# Patient Record
Sex: FEMALE | Race: WHITE | Employment: FULL TIME | URBAN - METROPOLITAN AREA
[De-identification: names, ages, dates, MRNs, and addresses within clinical notes are randomized per-mention and may not be internally consistent; named-entity substitution may affect disease eponyms.]

---

## 2017-08-29 ENCOUNTER — HOSPITAL ENCOUNTER (EMERGENCY)
Age: 27
Discharge: HOME | End: 2017-08-29
Attending: EMERGENCY MEDICINE
Payer: MEDICAID

## 2017-08-29 VITALS
DIASTOLIC BLOOD PRESSURE: 72 MMHG | WEIGHT: 105 LBS | RESPIRATION RATE: 16 BRPM | HEART RATE: 57 BPM | HEIGHT: 61 IN | TEMPERATURE: 98.7 F | BODY MASS INDEX: 19.83 KG/M2 | SYSTOLIC BLOOD PRESSURE: 114 MMHG | OXYGEN SATURATION: 99 %

## 2017-08-29 DIAGNOSIS — M25.531 RIGHT WRIST PAIN: ICD-10-CM

## 2017-08-29 DIAGNOSIS — Z13.9 ENCOUNTER FOR MEDICAL SCREENING EXAMINATION: Primary | ICD-10-CM

## 2017-08-29 LAB
-: ABNORMAL
AMORPHOUS: ABNORMAL
BACTERIA: ABNORMAL
BILIRUBIN URINE: NEGATIVE
CASTS UA: ABNORMAL /LPF
COLOR: YELLOW
COMMENT UA: ABNORMAL
CRYSTALS, UA: ABNORMAL /HPF
EPITHELIAL CELLS UA: ABNORMAL /HPF
GLUCOSE URINE: NEGATIVE
HCG(URINE) PREGNANCY TEST: NEGATIVE
KETONES, URINE: NEGATIVE
LEUKOCYTE ESTERASE, URINE: ABNORMAL
MUCUS: ABNORMAL
NITRITE, URINE: NEGATIVE
OTHER OBSERVATIONS UA: ABNORMAL
PH UA: 7 (ref 5–8)
PROTEIN UA: NEGATIVE
RBC UA: ABNORMAL /HPF
RENAL EPITHELIAL, UA: ABNORMAL /HPF
SPECIFIC GRAVITY UA: 1.02 (ref 1–1.03)
TRICHOMONAS: ABNORMAL
TURBIDITY: CLEAR
URINE HGB: ABNORMAL
UROBILINOGEN, URINE: NORMAL
WBC UA: ABNORMAL /HPF
YEAST: ABNORMAL

## 2017-08-29 PROCEDURE — 99285 EMERGENCY DEPT VISIT HI MDM: CPT

## 2017-08-29 PROCEDURE — 6370000000 HC RX 637 (ALT 250 FOR IP): Performed by: EMERGENCY MEDICINE

## 2017-08-29 PROCEDURE — 81001 URINALYSIS AUTO W/SCOPE: CPT

## 2017-08-29 PROCEDURE — 84703 CHORIONIC GONADOTROPIN ASSAY: CPT

## 2017-08-29 PROCEDURE — 93005 ELECTROCARDIOGRAM TRACING: CPT

## 2017-08-29 RX ORDER — IBUPROFEN 600 MG/1
600 TABLET ORAL EVERY 6 HOURS PRN
Qty: 30 TABLET | Refills: 0 | Status: SHIPPED | OUTPATIENT
Start: 2017-08-29 | End: 2017-10-04

## 2017-08-29 RX ORDER — IBUPROFEN 600 MG/1
600 TABLET ORAL ONCE
Status: COMPLETED | OUTPATIENT
Start: 2017-08-29 | End: 2017-08-29

## 2017-08-29 RX ADMIN — IBUPROFEN 600 MG: 600 TABLET, FILM COATED ORAL at 18:56

## 2017-08-29 ASSESSMENT — PAIN DESCRIPTION - PAIN TYPE: TYPE: ACUTE PAIN

## 2017-08-29 ASSESSMENT — PAIN DESCRIPTION - DESCRIPTORS: DESCRIPTORS: ACHING

## 2017-08-29 ASSESSMENT — PAIN SCALES - GENERAL
PAINLEVEL_OUTOF10: 5
PAINLEVEL_OUTOF10: 5

## 2017-08-29 ASSESSMENT — PAIN DESCRIPTION - FREQUENCY: FREQUENCY: CONTINUOUS

## 2017-09-05 LAB
EKG ATRIAL RATE: 80 BPM
EKG P AXIS: 68 DEGREES
EKG P-R INTERVAL: 140 MS
EKG Q-T INTERVAL: 366 MS
EKG QRS DURATION: 76 MS
EKG QTC CALCULATION (BAZETT): 422 MS
EKG R AXIS: 69 DEGREES
EKG T AXIS: 29 DEGREES
EKG VENTRICULAR RATE: 80 BPM

## 2017-10-04 ENCOUNTER — HOSPITAL ENCOUNTER (EMERGENCY)
Age: 27
Discharge: HOME | End: 2017-10-04
Attending: EMERGENCY MEDICINE

## 2017-10-04 ENCOUNTER — APPOINTMENT (OUTPATIENT)
Dept: GENERAL RADIOLOGY | Age: 27
End: 2017-10-04

## 2017-10-04 VITALS
HEIGHT: 62 IN | DIASTOLIC BLOOD PRESSURE: 66 MMHG | SYSTOLIC BLOOD PRESSURE: 120 MMHG | HEART RATE: 88 BPM | TEMPERATURE: 98.6 F | BODY MASS INDEX: 20.24 KG/M2 | WEIGHT: 110 LBS | OXYGEN SATURATION: 100 % | RESPIRATION RATE: 16 BRPM

## 2017-10-04 DIAGNOSIS — M77.8 TENDINITIS OF WRIST: Primary | ICD-10-CM

## 2017-10-04 PROCEDURE — 73110 X-RAY EXAM OF WRIST: CPT

## 2017-10-04 PROCEDURE — 6370000000 HC RX 637 (ALT 250 FOR IP): Performed by: PHYSICIAN ASSISTANT

## 2017-10-04 PROCEDURE — 99283 EMERGENCY DEPT VISIT LOW MDM: CPT

## 2017-10-04 RX ORDER — PREDNISONE 20 MG/1
20 TABLET ORAL DAILY
Qty: 5 TABLET | Refills: 0 | Status: SHIPPED | OUTPATIENT
Start: 2017-10-04 | End: 2017-10-09

## 2017-10-04 RX ORDER — IBUPROFEN 600 MG/1
600 TABLET ORAL ONCE
Status: COMPLETED | OUTPATIENT
Start: 2017-10-04 | End: 2017-10-04

## 2017-10-04 RX ORDER — IBUPROFEN 600 MG/1
600 TABLET ORAL ONCE
Status: DISCONTINUED | OUTPATIENT
Start: 2017-10-04 | End: 2017-10-04

## 2017-10-04 RX ADMIN — IBUPROFEN 600 MG: 600 TABLET, FILM COATED ORAL at 20:59

## 2017-10-04 ASSESSMENT — PAIN DESCRIPTION - PAIN TYPE: TYPE: ACUTE PAIN

## 2017-10-04 ASSESSMENT — PAIN SCALES - GENERAL
PAINLEVEL_OUTOF10: 4
PAINLEVEL_OUTOF10: 8

## 2017-10-04 ASSESSMENT — PAIN DESCRIPTION - LOCATION: LOCATION: WRIST

## 2017-10-04 ASSESSMENT — PAIN DESCRIPTION - ORIENTATION: ORIENTATION: RIGHT

## 2017-10-04 ASSESSMENT — PAIN DESCRIPTION - DESCRIPTORS: DESCRIPTORS: SHARP;SHOOTING

## 2017-10-04 ASSESSMENT — ENCOUNTER SYMPTOMS: BACK PAIN: 0

## 2017-10-04 ASSESSMENT — PAIN DESCRIPTION - ONSET: ONSET: PROGRESSIVE

## 2017-10-04 NOTE — ED AVS SNAPSHOT
ED Patient Work/School Excuse Letter         [address]  [address] [facility] [address]  [phone]  Dept: [phone]       October 4, 2017    Patient: Livier Urbina   YOB: 1990   Date of Visit: 10/4/2017       To Whom It May Concern:    Sydney Drake was seen and treated in our Emergency Department on 10/4/2017. She may return to work on 10/06/17           If you have any questions or concerns, please don't hesitate to call.     Sincerely,              Signature:__________________________________

## 2017-10-04 NOTE — ED AVS SNAPSHOT
After Visit Summary  (Discharge Instructions)    Medication List for Home    Based on the information you provided to us as well as any changes during this visit, the following is your updated medication list.  Compare this with your prescription bottles at home. If you have any questions or concerns, contact your primary care physician's office. Daily Medication List (This medication list can be shared with any Healthcare provider who is helping you manage your medications)      There are NEW medications for you. START taking them after you leave the hospital     predniSONE 20 MG tablet   Commonly known as:  DELTASONE   Take 1 tablet by mouth daily for 5 days         ASK your doctor about these medications if you have questions     ibuprofen 600 MG tablet   Commonly known as:  ADVIL;MOTRIN   Take 1 tablet by mouth every 6 hours as needed for Pain       naproxen 375 MG tablet   Commonly known as:  NAPROSYN   Take 1 tablet by mouth 2 times daily       norelgestromin-ethinyl estradiol 150-35 MCG/24HR   Commonly known as:  XULANE   APPLY ONE PATCH EXTERNALLY EVERY 7 DAYS. REMOVE OLD PATCH. Where to Get Your Medications      You can get these medications from any pharmacy     Bring a paper prescription for each of these medications     predniSONE 20 MG tablet               Allergies as of 10/4/2017     No Known Allergies      Immunizations as of 10/4/2017     No immunizations on file. After Visit Summary    This summary was created for you. Thank you for entrusting your care to us.   The following information includes details about your hospital/visit stay along with steps you should take to help with your recovery once you leave the hospital.  In this packet, you will find information about the topics listed below:    · Instructions about your medications including a list of your home medications  · A summary of your hospital visit · Follow-up appointments once you have left the hospital  · Your care plan at home      You may receive a survey regarding the care you received during your stay. Your input is valuable to us. We encourage you to complete and return your survey in the envelope provided. We hope you will choose us in the future for your healthcare needs. Patient Information     Patient Name ARMANDO Rapp 1990      Care Provided at:     Name Address Phone       [address]  [address] [phone]            Your Visit    Here you will find information about your visit, including the reason for your visit. Please take this sheet with you when you visit your doctor or other health care provider in the future. It will help determine the best possible medical care for you at that time. If you have any questions once you leave the hospital, please call the department phone number listed below. Diagnoses this visit     Your diagnosis was TENDINITIS OF WRIST. Visit Information     Date of Visit Department Dept Phone    10/4/2017 [facility] [phone]      You were seen by     You were seen by Lawrence Michel MD and Shantelle Ramsey PA-C. Follow-up Appointments    Below is a list of your follow-up and future appointments. This may not be a complete list as you may have made appointments directly with providers that we are not aware of or your providers may have made some for you. Please call your providers to confirm appointments. It is important to keep your appointments. Please bring your current insurance card, photo ID, co-pay, and all medication bottles to your appointment. If self-pay, payment is expected at the time of service.         Follow-up Information     Schedule an appointment as soon as possible for a visit with Karlie Gamboa MD.    Specialty:  Family Medicine    Why:  As needed    Contact information: Children exposed to secondhand smoke are at an increased risk of:  Sudden Infant Death Syndrome (SIDS), acute respiratory infections, inflammation of the middle ear, and severe asthma. Over a longer time, it causes heart disease and lung cancer. There is no safe level of exposure to secondhand smoke. Important information for a smoker       SMOKING: QUIT SMOKING. THIS IS THE MOST IMPORTANT ACTION YOU CAN TAKE TO IMPROVE YOUR CURRENT AND FUTURE HEALTH. Call the [facility] [place] NOW ([phone])    Smoking harms nonsmokers. When nonsmokers are around people who smoke, they absorb nicotine, carbon monoxide, and other ingredients of tobacco smoke. DO NOT SMOKE AROUND CHILDREN     Children exposed to secondhand smoke are at an increased risk of:  Sudden Infant Death Syndrome (SIDS), acute respiratory infections, inflammation of the middle ear, and severe asthma. Over a longer time, it causes heart disease and lung cancer. There is no safe level of exposure to secondhand smoke. [employer] Signup     [employer] allows you to send messages to your doctor, view your test results, renew your prescriptions, schedule appointments, view visit notes, and more. How Do I Sign Up? 1. In your Internet browser, go to [email or web address]  2. Click on the Sign Up Now link in the Sign In box. You will see the New Member Sign Up page. 3. Enter your [employer] Access Code exactly as it appears below. You will not need to use this code after youve completed the sign-up process. If you do not sign up before the expiration date, you must request a new code. [employer] Access Code: [email or web address]  Expires: 10/28/2017  6:39 PM    4. Enter your Social Security Number ([id number]) and Date of Birth (mm/dd/yyyy) as indicated and click Submit. You will be taken to the next sign-up page. 5. Create a My [employer] ID. This will be your InfoRemate login ID and cannot be changed, so think of one that is secure and easy to remember. 6. Create a [employer] password. You can change your password at any time. 7. Enter your Password Reset Question and Answer. This can be used at a later time if you forget your password. 8. Enter your e-mail address. You will receive e-mail notification when new information is available in [address] 9. Click Sign Up. You can now view your medical record. Additional Information  If you have questions, please contact the physician practice where you receive care. Remember, InfoRemate is NOT to be used for urgent needs. For medical emergencies, dial 911. For questions regarding your [employer] account call [phone]. If you have a clinical question, please call your doctor's office. View your information online  ? Review your current list of  medications, immunization, and allergies. ? Review your future test results online . ? Review your discharge instructions provided by your caregivers at discharge    Certain functionality such as prescription refills, scheduling appointments or sending messages to your provider are not activated if your provider does not use TheTake in his/her office    For questions regarding your [employer] account call [phone]. If you have a clinical question, please call your doctor's office. The information on all pages of the After Visit Summary has been reviewed with me, the patient and/or responsible adult, by my health care provider(s). I had the opportunity to ask questions regarding this information. I understand I should dispose of my armband safely at home to protect my health information. A complete copy of the After Visit Summary has been given to me, the patient and/or responsible adult.          Patient Signature/Responsible Adult: ___________________________________ snug. But it should not be tight enough to cause numbness, tingling, or swelling. · If your doctor gave you a splint or brace, wear it as directed. It will protect your wrist until it is better. · Take pain medicines exactly as directed. ¨ If the doctor gave you a prescription medicine for pain, take it as prescribed. ¨ If you are not taking a prescription pain medicine, ask your doctor if you can take an over-the-counter medicine. · If your doctor prescribes antibiotics, take them as directed. Do not stop taking them just because you feel better. You need to take the full course of antibiotics. · Try not to use your injured wrist and hand. · After you are better, do exercises to make the muscles around your tendon stronger. This can prevent the problem from coming back. Follow instructions from your doctor. When should you call for help? Call your doctor now or seek immediate medical care if:  · Your hand or fingers are cool or pale or change colors. · You have tingling, weakness, or numbness in your hand and fingers. · Your pain gets worse. · The tendon may be infected. Signs of infection include:  ¨ Increased pain and tenderness around the wrist or thumb. ¨ Swelling or redness of the wrist or thumb. ¨ A fever. Watch closely for changes in your health, and be sure to contact your doctor if:  · You do not get better as expected. Where can you learn more? Go to [email or web address]. [email or web address] and sign in to your [email or web address] account. Enter I793 in the [facility] box to learn more about \"Tenosynovitis of the Wrist: Care Instructions. \"     If you do not have an account, please click on the \"Sign Up Now\" link. Current as of: March 21, 2017  Content Version: 11.3  © 7214-7798 [employer]. Care instructions adapted under license by [facility] ([facility]).  If you have questions about a medical condition or this instruction, always ask your healthcare professional. [employer] disclaims any warranty or liability for your use of this information.

## 2017-10-05 NOTE — ED NOTES
Pt given instructions for follow-up and discharge. Pt given education on prednisone. Pt given education on splint care. Pt verbalizes understanding. Pt is A&O x4, PWD, eupneic, and ambulatory with steady, even gait upon discharge.       Nilesh Arredondo RN  10/04/17 1065

## 2017-10-05 NOTE — ED PROVIDER NOTES
[address]  eMERGENCY dEPARTMENT eNCOUnter      Pt Name: Roberto Browning  MRN: [id number]  [place] 1990  Date of evaluation: 10/4/17      CHIEF COMPLAINT       Chief Complaint   Patient presents with    Wrist Pain     right wrist pain s/p laoding & unloading boxes         HISTORY OF PRESENT ILLNESS    Roberto Browning is a 33 y.o. female who presents complaining of right wrist pain  Patient is a 33 y.o. female presenting with wrist fracture. Wrist Problem   Location:  Wrist  Affected location: Diffuse right wrist pain after lifting heavy boxes did not fall there is no trauma. Injury: no    Pain details:     Quality:  Aching    Radiates to:  R fingers    Severity:  Mild    Timing:  Constant    Progression:  Unchanged  Handedness:  Right-handed  Dislocation: no    Foreign body present:  No foreign bodies  Prior injury to area:  Unable to specify  Relieved by: Ice and rest  Worsened by: Movement and stretching area  Ineffective treatments:  None tried  Associated symptoms: numbness    Associated symptoms: no back pain, no fatigue, no swelling and no tingling        REVIEW OF SYSTEMS       Review of Systems   Constitutional: Negative for fatigue. Musculoskeletal: Negative for back pain. PAST MEDICAL HISTORY     Past Medical History:   Diagnosis Date    ADHD (attention deficit hyperactivity disorder)     Costochondritis        SURGICAL HISTORY       Past Surgical History:   Procedure Laterality Date    APPENDECTOMY         CURRENT MEDICATIONS       Previous Medications    NAPROXEN (NAPROSYN) 375 MG TABLET    Take 1 tablet by mouth 2 times daily    NORELGESTROMIN-ETHINYL ESTRADIOL (XULANE) 150-35 MCG/24HR    APPLY ONE PATCH EXTERNALLY EVERY 7 DAYS. REMOVE OLD PATCH. ALLERGIES     has No Known Allergies. FAMILY HISTORY     indicated that the status of her brother is unknown. She indicated that the status of her maternal grandmother is unknown.       SOCIAL HISTORY      reports that she has been smoking Cigarettes. She has a 2.50 pack-year smoking history. She has never used smokeless tobacco. She reports that she drinks alcohol. She reports that she does not use illicit drugs. PHYSICAL EXAM     INITIAL VITALS: /66  Pulse 88  Temp 98.6 °F (37 °C) (Oral)   Resp 16  Ht 5' 2\" (1.575 m)  Wt 110 lb (49.9 kg)  LMP 09/25/2017  SpO2 100%  BMI 20.12 kg/m2     Physical Exam   Constitutional: She is oriented to person, place, and time. She appears well-developed and well-nourished. HENT:   Head: Normocephalic and atraumatic. Cardiovascular: Normal rate, regular rhythm and normal heart sounds. Pulmonary/Chest: Effort normal and breath sounds normal.   Musculoskeletal: She exhibits tenderness. She exhibits no edema or deformity. Right wrist: She exhibits normal range of motion, no tenderness, no bony tenderness, no swelling, no effusion and no crepitus. Negative Tinel's negative Phalen's, pain worse along the medial aspect radiates to the right index finger, there is no swelling there is no redness, full range of motion sensation intact brisk capillary refill noted   Neurological: She is alert and oriented to person, place, and time. MEDICAL DECISION MAKING:         DIAGNOSTIC RESULTS     EKG: All EKG's are interpreted by the Emergency Department Physician who either signs or Co-signs this chart in the absence of a cardiologist.        RADIOLOGY:All plain film, CT, MRI, and formal ultrasound images (except ED bedside ultrasound) are read by the radiologist and the images and interpretations are directly viewed by the emergency physician. FINDINGS:   No bone, joint or soft tissue abnormality.  No evidence of an acute fracture   or dislocation.           Impression   Normal right wrist.           LABS: All lab results were reviewed by myself, and all abnormals are listed below.   Labs Reviewed - No data to display      EMERGENCY DEPARTMENT COURSE:   Vitals: Vitals:    10/04/17 2009   BP: 120/66   Pulse: 88   Resp: 16   Temp: 98.6 °F (37 °C)   TempSrc: Oral   SpO2: 100%   Weight: 110 lb (49.9 kg)   Height: 5' 2\" (1.575 m)       The patient was given the following medications while in the emergency department:  Orders Placed This Encounter   Medications    ibuprofen (ADVIL;MOTRIN) tablet 600 mg    predniSONE (DELTASONE) 20 MG tablet     Sig: Take 1 tablet by mouth daily for 5 days     Dispense:  5 tablet     Refill:  0    DISCONTD: ibuprofen (ADVIL;MOTRIN) tablet 600 mg       -------------------------      CRITICAL CARE:     CONSULTS:  None    PROCEDURES:  Procedures    FINAL IMPRESSION      1.  Tendinitis of wrist          DISPOSITION/PLAN   DISPOSITION     PATIENT REFERRED TO:  Jai Damon MD  [address] [facility] [address] [facility] 100 [facility] [address]  [phone]    Schedule an appointment as soon as possible for a visit  As needed    Tanner Jung MD  [address]  [address] [facility] [address]  [phone]    Schedule an appointment as soon as possible for a visit in 2 days  As needed    Osbaldo Willis MD  [address] [address] [phone]    Schedule an appointment as soon as possible for a visit in 2 days  If symptoms worsen      DISCHARGE MEDICATIONS:  New Prescriptions    PREDNISONE (DELTASONE) 20 MG TABLET    Take 1 tablet by mouth daily for 5 days       (Please note that portions of this note were completed with a voice recognition program.  Efforts were made to edit the dictations but occasionally words are mis-transcribed.)    CAESAR Rm PA-C  10/04/17 2905

## 2017-11-16 ENCOUNTER — HOSPITAL ENCOUNTER (EMERGENCY)
Age: 27
Discharge: HOME | End: 2017-11-16
Attending: EMERGENCY MEDICINE

## 2017-11-16 VITALS
OXYGEN SATURATION: 100 % | DIASTOLIC BLOOD PRESSURE: 80 MMHG | RESPIRATION RATE: 16 BRPM | HEIGHT: 62 IN | HEART RATE: 80 BPM | SYSTOLIC BLOOD PRESSURE: 131 MMHG | BODY MASS INDEX: 21.16 KG/M2 | TEMPERATURE: 98.5 F | WEIGHT: 115 LBS

## 2017-11-16 DIAGNOSIS — M79.641 RIGHT HAND PAIN: Primary | ICD-10-CM

## 2017-11-16 PROCEDURE — 99283 EMERGENCY DEPT VISIT LOW MDM: CPT

## 2017-11-16 ASSESSMENT — PAIN SCALES - GENERAL: PAINLEVEL_OUTOF10: 6

## 2017-11-16 ASSESSMENT — PAIN DESCRIPTION - DESCRIPTORS: DESCRIPTORS: ACHING

## 2017-11-16 ASSESSMENT — PAIN DESCRIPTION - ORIENTATION: ORIENTATION: RIGHT

## 2017-11-16 ASSESSMENT — PAIN DESCRIPTION - LOCATION: LOCATION: HAND

## 2017-11-16 ASSESSMENT — PAIN DESCRIPTION - FREQUENCY: FREQUENCY: CONTINUOUS

## 2017-11-16 NOTE — ED PROVIDER NOTES
has been smoking Cigarettes. She has a 2.50 pack-year smoking history. She has never used smokeless tobacco. She reports that she drinks alcohol. She reports that she does not use drugs. Lives at home with others    PHYSICAL EXAM    (up to 7 for level 4, 8 or more for level 5)     ED Triage Vitals [11/16/17 1708]   BP Temp Temp Source Pulse Resp SpO2 Height Weight   131/80 98.5 °F (36.9 °C) Oral 80 16 100 % 5' 2\" (1.575 m) 115 lb (52.2 kg)       Physical Exam   Nursing note and vitals reviewed. Constitutional: Oriented to person, place, and time and well-developed, well-nourished, and in no distress. Head: Normocephalic and atraumatic. Ear: External ears normal.   Nose: Nose normal and midline. Eyes: Conjunctivae and EOM are normal. Pupils are equal, round, and reactive to light. Cardiovascular: Normal rate, regular rhythm, normal heart sounds and intact distal pulses. Pulmonary/Chest: Effort normal and breath sounds normal. No respiratory distress. No wheezes. No rales. No chest tenderness. Musculoskeletal: no deformity or swelling to right hand.  strength 5/5 distal n/v intact. FROM. Neurological: Alert and oriented to person, place, and time. GCS score is 15. Skin: Skin is warm and dry. No rash noted. No erythema. No pallor. DIAGNOSTIC RESULTS     RADIOLOGY:   All plain film, CT, MRI, and formal ultrasound images (except ED bedside ultrasound) are read by the radiologist and the images and interpretations are directly viewed by the emergency physician. No orders to display           No results found. LABS:  Labs Reviewed - No data to display    All other labs were within normal range or not returned as of this dictation.     EMERGENCY DEPARTMENT COURSE and DIFFERENTIAL DIAGNOSIS/MDM:   Vitals:    Vitals:    11/16/17 1708   BP: 131/80   Pulse: 80   Resp: 16   Temp: 98.5 °F (36.9 °C)   TempSrc: Oral   SpO2: 100%   Weight: 115 lb (52.2 kg)   Height: 5' 2\" (1.575 m)       Ace

## 2018-08-20 ENCOUNTER — HOSPITAL ENCOUNTER (EMERGENCY)
Age: 28
Discharge: HOME | End: 2018-08-20
Attending: EMERGENCY MEDICINE

## 2018-08-20 VITALS
BODY MASS INDEX: 20.2 KG/M2 | HEIGHT: 61 IN | TEMPERATURE: 97.5 F | OXYGEN SATURATION: 98 % | SYSTOLIC BLOOD PRESSURE: 120 MMHG | RESPIRATION RATE: 16 BRPM | DIASTOLIC BLOOD PRESSURE: 75 MMHG | WEIGHT: 107 LBS | HEART RATE: 87 BPM

## 2018-08-20 DIAGNOSIS — B35.4 TINEA CORPORIS: Primary | ICD-10-CM

## 2018-08-20 PROCEDURE — 99282 EMERGENCY DEPT VISIT SF MDM: CPT

## 2018-08-20 RX ORDER — KETOCONAZOLE 20 MG/G
CREAM TOPICAL
Qty: 30 G | Refills: 0 | Status: SHIPPED | OUTPATIENT
Start: 2018-08-20

## 2018-08-20 ASSESSMENT — ENCOUNTER SYMPTOMS
TROUBLE SWALLOWING: 0
VOMITING: 0
NAUSEA: 0
COUGH: 0
SHORTNESS OF BREATH: 0

## 2018-08-21 NOTE — ED PROVIDER NOTES
[address]  eMERGENCY dEPARTMENT eNCOUnter   Independent Attestation     Pt Name: Marizol Mathew  MRN: [id number]  [place] 1990  Date of evaluation: 8/21/18     Marizol Mathew is a 33 y.o. female with CC: Tinea (chest)      Based on the medical record the care appears appropriate. I was personally available for consultation in the Emergency Department.     Gautam Miles MD  Attending Emergency Physician                    Abiola Alberto MD  08/21/18 7732

## 2018-08-21 NOTE — ED PROVIDER NOTES
[address] [facility] ED  eMERGENCY dEPARTMENT eNCOUnter      Pt Name: Ronen Avila  MRN: [id number]  [place] 1990  Date of evaluation: 8/20/2018  Provider: Marilyn Carey       Chief Complaint   Patient presents with    Tinea     chest         HISTORY OF PRESENT ILLNESS  (Location/Symptom, Timing/Onset, Context/Setting, Quality, Duration, Modifying Factors, Severity.)   Ronen Avila is a 33 y.o. female who presents to the emergency department With complaints of rash to upper chest.  States it started about a week ago and has been spreading. Patient states that initially she had 2 circular red lesions to right upper chest and now she has 1 more to left upper chest.  Has been applying Lotrimin cream but does not seem like it's working. Lesions  are very itchy but not painful. Relates that recently she stayed with her friend who also has a cat. Nursing Notes were reviewed and I agree. REVIEW OF SYSTEMS    (2-9 systems for level 4, 10 or more for level 5)     Review of Systems   Constitutional: Negative for chills and fever. HENT: Negative for trouble swallowing. Respiratory: Negative for cough and shortness of breath. Cardiovascular: Negative for chest pain and palpitations. Gastrointestinal: Negative for nausea and vomiting. Skin: Positive for rash. Except as noted above the remainder of the review of systems was reviewed and negative. PAST MEDICAL HISTORY         Diagnosis Date    ADHD (attention deficit hyperactivity disorder)     Costochondritis      Reviewed. SURGICAL HISTORY           Procedure Laterality Date    APPENDECTOMY       Reviewed. CURRENT MEDICATIONS       Previous Medications    NAPROXEN (NAPROSYN) 375 MG TABLET    Take 1 tablet by mouth 2 times daily    NORELGESTROMIN-ETHINYL ESTRADIOL (XULANE) 150-35 MCG/24HR    APPLY ONE PATCH EXTERNALLY EVERY 7 DAYS. REMOVE OLD PATCH.        ALLERGIES     Patient has no known allergies. FAMILY HISTORY       Family History   Problem Relation Age of Onset    Heart Disease Brother     Heart Disease Maternal Grandmother     Cancer Maternal Grandmother      Family Status   Relation Status    Brother (Not Specified)    MGM (Not Specified)      Reviewed and not relevant. SOCIAL HISTORY      reports that she has been smoking Cigarettes. She has a 2.50 pack-year smoking history. She has never used smokeless tobacco. She reports that she drinks alcohol. She reports that she does not use drugs. Reviewed. PHYSICAL EXAM    (up to 7 for level 4, 8 or more for level 5)     ED Triage Vitals [08/20/18 2231]   BP Temp Temp Source Pulse Resp SpO2 Height Weight   120/75 97.5 °F (36.4 °C) Oral 87 16 98 % 5' 1\" (1.549 m) 107 lb (48.5 kg)       Physical Exam   Constitutional: She is oriented to person, place, and time. She appears well-developed and well-nourished. No distress. HENT:   Head: Normocephalic and atraumatic. Right Ear: External ear normal.   Left Ear: External ear normal.   Nose: Nose normal.   Eyes: Right eye exhibits no discharge. Left eye exhibits no discharge. No scleral icterus. Neck: Normal range of motion. No tracheal deviation present. Pulmonary/Chest: Effort normal. No stridor. No respiratory distress. Musculoskeletal: Normal range of motion. She exhibits no edema. Neurological: She is alert and oriented to person, place, and time. Coordination normal.   Skin: Skin is warm and dry. Rash noted. She is not diaphoretic. Psychiatric: She has a normal mood and affect. Her behavior is normal.       DIAGNOSTIC RESULTS     RADIOLOGY:   none      LABS:  Labs Reviewed - No data to display    All other labs were within normal range or not returned as of this dictation.     EMERGENCY DEPARTMENT COURSE and DIFFERENTIAL DIAGNOSIS/MDM:   Patient presents to ED with complaints of Rash to right and left upper chest.  Has raised erythematous annular lesions consistent with ringworm. Has tried Lotrimin cream with no improvement. Will prescribe ketoconazole cream.  Patient advised to follow-up with dermatologist if symptoms do not resolve with treatment. Enid Ridley to discharge home. Follow-up with family doctor or clinic of choice in 1 or 2 days for a recheck. Return to ED if any worsening or new symptoms. Vitals:    Vitals:    08/20/18 2231   BP: 120/75   Pulse: 87   Resp: 16   Temp: 97.5 °F (36.4 °C)   TempSrc: Oral   SpO2: 98%   Weight: 107 lb (48.5 kg)   Height: 5' 1\" (1.549 m)       Vitals reviewed. PROCEDURES:  None    FINAL IMPRESSION      1. Tinea corporis          DISPOSITION/PLAN   DISPOSITION Decision To Discharge 08/20/2018 10:58:32 PM      PATIENT REFERRED TO:  Priscila Arguelles MD  [address] [place] [phone]    Schedule an appointment as soon as possible for a visit   Follow up visit    [facility] ED  [facility] [phone]    If symptoms worsen      DISCHARGE MEDICATIONS:  New Prescriptions    KETOCONAZOLE (NIZORAL) 2 % CREAM    Apply topically daily.        (Please note that portions of this note were completed with a voice recognition program.  Efforts were made to edit the dictations but occasionally words are mis-transcribed.)    Chaitanya England, MITCH - CNP  08/20/18 1439